# Patient Record
(demographics unavailable — no encounter records)

---

## 2025-07-09 NOTE — HISTORY OF PRESENT ILLNESS
[FreeTextEntry1] : 17 yo female with h/o ...   competitive rower hurts to breathe deeply,  sharp  stabbing  hasn't occured since she stopped rowing   random can also occur with rest whe  usually 30 sec to 1-2 minutes  grandparents CABG uncle passed away around covid - possible cardiac anomaly - at age 57   grand aunt needed a procedure for heart - suddenly

## 2025-07-09 NOTE — HISTORY OF PRESENT ILLNESS
[FreeTextEntry1] : 19 yo female with h/o ...   competitive rower hurts to breathe deeply,  sharp  stabbing  hasn't occured since she stopped rowing   random can also occur with rest whe  usually 30 sec to 1-2 minutes  grandparents CABG uncle passed away around covid - possible cardiac anomaly - at age 57   grand aunt needed a procedure for heart - suddenly